# Patient Record
Sex: MALE | Race: WHITE | NOT HISPANIC OR LATINO | Employment: OTHER | URBAN - NONMETROPOLITAN AREA
[De-identification: names, ages, dates, MRNs, and addresses within clinical notes are randomized per-mention and may not be internally consistent; named-entity substitution may affect disease eponyms.]

---

## 2018-07-07 ENCOUNTER — HOSPITAL ENCOUNTER (EMERGENCY)
Facility: HOSPITAL | Age: 53
End: 2018-07-07
Attending: EMERGENCY MEDICINE

## 2025-07-03 ENCOUNTER — HOSPITAL ENCOUNTER (EMERGENCY)
Facility: HOSPITAL | Age: 60
Discharge: HOME | End: 2025-07-03

## 2025-07-03 ENCOUNTER — APPOINTMENT (OUTPATIENT)
Dept: ULTRASOUND IMAGING | Facility: HOSPITAL | Age: 60
End: 2025-07-03
Payer: MEDICAID

## 2025-07-03 VITALS
DIASTOLIC BLOOD PRESSURE: 87 MMHG | OXYGEN SATURATION: 98 % | RESPIRATION RATE: 16 BRPM | WEIGHT: 212 LBS | HEART RATE: 78 BPM | SYSTOLIC BLOOD PRESSURE: 150 MMHG | BODY MASS INDEX: 27.21 KG/M2 | HEIGHT: 74 IN | TEMPERATURE: 98.2 F

## 2025-07-03 DIAGNOSIS — L02.91 ABSCESS: Primary | ICD-10-CM

## 2025-07-03 PROCEDURE — 87070 CULTURE OTHR SPECIMN AEROBIC: CPT | Performed by: EMERGENCY MEDICINE

## 2025-07-03 PROCEDURE — 93976 VASCULAR STUDY: CPT

## 2025-07-03 PROCEDURE — 99284 EMERGENCY DEPT VISIT MOD MDM: CPT

## 2025-07-03 PROCEDURE — 87205 SMEAR GRAM STAIN: CPT | Performed by: EMERGENCY MEDICINE

## 2025-07-03 PROCEDURE — 25010000002 TETANUS-DIPHTH-ACELL PERTUSSIS 5-2.5-18.5 LF-MCG/0.5 SUSPENSION PREFILLED SYRINGE

## 2025-07-03 PROCEDURE — 25010000002 LIDOCAINE PF 1% 1 % SOLUTION

## 2025-07-03 PROCEDURE — 90715 TDAP VACCINE 7 YRS/> IM: CPT

## 2025-07-03 PROCEDURE — 90471 IMMUNIZATION ADMIN: CPT

## 2025-07-03 PROCEDURE — 76870 US EXAM SCROTUM: CPT

## 2025-07-03 PROCEDURE — 87186 SC STD MICRODIL/AGAR DIL: CPT | Performed by: EMERGENCY MEDICINE

## 2025-07-03 PROCEDURE — 87077 CULTURE AEROBIC IDENTIFY: CPT | Performed by: EMERGENCY MEDICINE

## 2025-07-03 RX ORDER — CEPHALEXIN 500 MG/1
500 CAPSULE ORAL 2 TIMES DAILY
Qty: 20 CAPSULE | Refills: 0 | Status: SHIPPED | OUTPATIENT
Start: 2025-07-03 | End: 2025-07-13

## 2025-07-03 RX ORDER — SULFAMETHOXAZOLE AND TRIMETHOPRIM 800; 160 MG/1; MG/1
1 TABLET ORAL ONCE
Status: COMPLETED | OUTPATIENT
Start: 2025-07-03 | End: 2025-07-03

## 2025-07-03 RX ORDER — SULFAMETHOXAZOLE AND TRIMETHOPRIM 800; 160 MG/1; MG/1
1 TABLET ORAL 2 TIMES DAILY
Qty: 20 TABLET | Refills: 0 | Status: SHIPPED | OUTPATIENT
Start: 2025-07-03 | End: 2025-07-13

## 2025-07-03 RX ORDER — HYDROCODONE BITARTRATE AND ACETAMINOPHEN 5; 325 MG/1; MG/1
1 TABLET ORAL ONCE
Refills: 0 | Status: COMPLETED | OUTPATIENT
Start: 2025-07-03 | End: 2025-07-03

## 2025-07-03 RX ORDER — CEPHALEXIN 500 MG/1
500 CAPSULE ORAL ONCE
Status: COMPLETED | OUTPATIENT
Start: 2025-07-03 | End: 2025-07-03

## 2025-07-03 RX ORDER — LIDOCAINE HYDROCHLORIDE 10 MG/ML
10 INJECTION, SOLUTION EPIDURAL; INFILTRATION; INTRACAUDAL; PERINEURAL ONCE
Status: COMPLETED | OUTPATIENT
Start: 2025-07-03 | End: 2025-07-03

## 2025-07-03 RX ORDER — NAPROXEN 500 MG/1
500 TABLET ORAL 2 TIMES DAILY PRN
Qty: 20 TABLET | Refills: 0 | Status: SHIPPED | OUTPATIENT
Start: 2025-07-03 | End: 2025-07-13

## 2025-07-03 RX ADMIN — LIDOCAINE HYDROCHLORIDE 10 ML: 10 INJECTION, SOLUTION EPIDURAL; INFILTRATION; INTRACAUDAL; PERINEURAL at 13:19

## 2025-07-03 RX ADMIN — TETANUS TOXOID, REDUCED DIPHTHERIA TOXOID AND ACELLULAR PERTUSSIS VACCINE, ADSORBED 0.5 ML: 5; 2.5; 8; 8; 2.5 SUSPENSION INTRAMUSCULAR at 14:09

## 2025-07-03 RX ADMIN — HYDROCODONE BITARTRATE AND ACETAMINOPHEN 1 TABLET: 5; 325 TABLET ORAL at 13:17

## 2025-07-03 RX ADMIN — CEPHALEXIN 500 MG: 500 CAPSULE ORAL at 13:17

## 2025-07-03 RX ADMIN — SULFAMETHOXAZOLE AND TRIMETHOPRIM 1 TABLET: 800; 160 TABLET ORAL at 13:17

## 2025-07-03 NOTE — ED PROVIDER NOTES
CHIEF CONCERN   Chief Complaint   Patient presents with    Abscess     Boil under left back/buttock x3 days. Has been using hot baths and compresses with minimal relief.        Subjective     History of Present Illness  This is a patient with a history of a previous abscess presenting with a boil.    The patient reports noticing a boil located between his leg and buttock 2 days ago. He describes the area as painful, with the discomfort extending to his testicles. He reports no systemic symptoms such as fever or chills. He has a history of a similar abscess in the same region, which was treated with antibiotics, but he notes that this current condition is more painful. He does not recall any previous abscesses opening up. He has no known allergies to penicillin and can tolerate Bactrim. He reports no history of MRSA infection or diabetes.          Review of Systems   Constitutional:  Positive for fatigue.   HENT:  Negative for congestion.    Skin:  Positive for color change and wound.       History reviewed. No pertinent past medical history.    History reviewed. No pertinent surgical history.    History reviewed. No pertinent family history.    Social History     Socioeconomic History    Marital status:    Tobacco Use    Smoking status: Former     Current packs/day: 0.00     Types: Cigarettes     Quit date: 2013     Years since quittin.6    Smokeless tobacco: Never   Substance and Sexual Activity    Alcohol use: Yes       No Known Allergies    No current facility-administered medications on file prior to encounter.     Current Outpatient Medications on File Prior to Encounter   Medication Sig Dispense Refill    Chlorcyclizine-Pseudoephed (STAHIST AD) 25-60 MG tablet One tablet three times a day as needed for congestion 30 tablet 0    Dextromethorphan Polistirex (DELSYM PO) Take  by mouth.      guaiFENesin (MUCINEX PO) Take  by mouth.      guaiFENesin-codeine (ROMILAR-AC) 100-10 MG/5ML syrup 5-10  "cc's by mouth QID as needed for cough 236 mL 0       /87 (BP Location: Left arm, Patient Position: Lying)   Pulse 78   Temp 98.2 °F (36.8 °C) (Oral)   Resp 16   Ht 188 cm (74\")   Wt 96.2 kg (212 lb)   SpO2 98%   BMI 27.22 kg/m²     Objective     Physical Exam  Vitals and nursing note reviewed. Exam conducted with a chaperone present.   Constitutional:       General: He is not in acute distress.     Appearance: He is not ill-appearing, toxic-appearing or diaphoretic.   HENT:      Head: Normocephalic and atraumatic.   Cardiovascular:      Rate and Rhythm: Normal rate.   Pulmonary:      Effort: Pulmonary effort is normal. No respiratory distress.      Breath sounds: Normal breath sounds.   Abdominal:      General: Bowel sounds are normal.      Palpations: Abdomen is soft.   Genitourinary:     Rectum: Mass and tenderness present. No internal hemorrhoid. Normal anal tone.       Musculoskeletal:         General: Swelling and tenderness present.      Cervical back: Neck supple.   Skin:     General: Skin is warm.      Capillary Refill: Capillary refill takes less than 2 seconds.      Findings: Erythema and lesion present.   Neurological:      Mental Status: He is alert.      Sensory: No sensory deficit.   Psychiatric:         Mood and Affect: Mood normal.         Behavior: Behavior normal.         Physical Exam  Chaperone: Present  Integument/Skin: Boil present on the skin between the leg and buttock, tender to palpation    Incision & Drainage    Date/Time: 7/3/2025 3:00 PM    Performed by: Nando Shen APRN  Authorized by: Nando Shen APRN    Consent:     Consent obtained:  Verbal    Consent given by:  Patient    Risks discussed:  Bleeding and incomplete drainage    Alternatives discussed:  Referral  Sherman protocol:     Procedure explained and questions answered to patient or proxy's satisfaction: yes      Patient identity confirmed:  Verbally with patient  Location:     Type:  Abscess    " Location:  Anogenital    Anogenital location:  Scrotal space  Pre-procedure details:     Skin preparation:  Povidone-iodine  Sedation:     Sedation type:  None  Anesthesia:     Anesthesia method:  Local infiltration    Local anesthetic:  Lidocaine 1% WITH epi  Procedure type:     Complexity:  Simple  Procedure details:     Ultrasound guidance: no      Needle aspiration: no      Incision types:  Stab incision    Incision depth:  Subcutaneous    Wound management:  Probed and deloculated, irrigated with saline and extensive cleaning    Drainage:  Purulent    Drainage amount:  Moderate    Packing materials:  1/2 in iodoform gauze  Post-procedure details:     Procedure completion:  Tolerated well, no immediate complications  Comments:      Patient tolerated the procedure well.  Culture obtained.   patient expressed immediate relief after lancing the abscess.  Encouraged to remove the packing in 48 hours.  Discussed wound care, will start on dual antibiotics and cover empirically MRSA and staph considering the area and abscess drained.           ED Course  ED Course as of 07/04/25 1728   Thu Jul 03, 2025   1339 Ultrasound at bedside [SN]   1459  patient's ultrasound shows no evidence of testicular torsion or mass lesions, patient does have small bilateral hydrocele ease.  Will I&D the abscess on the left Glutarol patient.  Plan to discharge home with dual antibiotics to cover staph and my MRSA.  Close follow-up with PCP for reevaluation. [SN]   1524 Discussed wound care with patient.  Tolerated procedure.  Wound culture obtained and sent.  Patient is aware to remove the packing in 48 hours.  Warm compress.  Worsening of her wound discussed with verbal understanding.  Encouraged to follow-up with primary care doctor for reevaluation of the wound. [SN]      ED Course User Index  [SN] Nando Shen APRN       Lab Results (last 24 hours)       ** No results found for the last 24 hours. **         US Scrotum & Testicles  with doppler  Result Date: 7/3/2025  Impression: Electronically Signed: Bandar Hernadez MD  7/3/2025 2:26 PM EDT  Workstation ID: [id number]    Narrative & Impression   US SCROTUM AND TESTICLES WITH DOPPLER     Date of Exam: 7/3/2025 1:18 PM EDT     Indication: abscess / left testicular pain.     Comparison: None available.     Technique: Multiple sonographic images of the testicles were obtained in transverse and longitudinal planes using grayscale imaging. Color and spectral Doppler imaging was also obtained.     Findings:  Right testicle measures 3.3 x 2.0 x 3.8 cm. Left testicle measures 3.0 x 2.1 x 3.8 cm.     The testicular parenchyma is homogeneous bilaterally. No evidence of intratesticular mass lesion. There is normal color Doppler flow to both testicles.     There is a small right hydrocele. Within the right epididymis there is a cyst or spermatocele measuring up to 1.2 cm in size.     There is a small left-sided hydrocele. Within the left epididymis there is a 8 mm cyst or spermatocele.     Patient has a palpable lump along the left inferior gluteal region. This is reported to be palpable. Images of this region demonstrate a complex hypoechoic masslike area measuring 3.6 x 3.0 cm in size. This has some increased through transmission and   peripheral color Doppler flow. This may represent a small abscess within the superficial soft tissues.       IMPRESSION:  Impression:    1. No evidence of testicular torsion or intratesticular mass lesion.  2. Small bilateral hydroceles.  3. At the site of the patient's palpable abnormality within the left gluteal region there is a 3.6 cm hypoechoic masslike area which could represent an abscess. Clinical correlation recommended.               Medical Decision Making  Patient is abscess and scrotal pain that he noticed 2 days ago.  Patient does have a history of abscess previously.  Cannot recall the last time he had a tetanus.  Boil between leg and buttock and tenderness  extends  to scrotum. Ultrasound performed to ensure abscess does not extend into scrotum/ rule out testicular torsion considering the radiating pain.   Pain medication, will start a dose of antibiotic in the ED pending results patient is agreeable to plan  Plan to I&D and DC home if no complication and abnormal findings that require admission  Tolerated the procedure well.  See procedure notes below.    Problems Addressed:  Abscess: complicated acute illness or injury    Risk  Prescription drug management.        Diagnoses and all orders for this visit:    1. Abscess (Primary)    Other orders  -     Cancel: US Testicular or Ovarian Vascular Complete; Standing  -     HYDROcodone-acetaminophen (NORCO) 5-325 MG per tablet 1 tablet; Refill: 0  -     lidocaine PF 1% (XYLOCAINE) injection 10 mL  -     Lac chart  please for I/D  [facility] Nursing Order (Specify); Standing  -     cephalexin (KEFLEX) capsule 500 mg  -     sulfamethoxazole-trimethoprim (BACTRIM DS,SEPTRA DS) 800-160 MG per tablet 1 tablet  -     Cancel: US Testicular or Ovarian Vascular Complete  -     Lac chart  please for I/D  [facility] Nursing Order (Specify)  -     US Scrotum & Testicles with doppler; Standing  -     US Scrotum & Testicles with doppler  -     Tetanus-Diphth-Acell Pertussis (BOOSTRIX) injection 0.5 mL  -     Incision & Drainage; Standing  -     Incision & Drainage  -     Wound Culture - Wound, Buttock, Left; Standing  -     Wound Culture - Wound, Buttock, Left  -     cephalexin (KEFLEX) 500 MG capsule; Take 1 capsule by mouth 2 (Two) Times a Day for 10 days.  Dispense: 20 capsule; Refill: 0  -     sulfamethoxazole-trimethoprim (BACTRIM DS,SEPTRA DS) 800-160 MG per tablet; Take 1 tablet by mouth 2 (Two) Times a Day for 10 days.  Dispense: 20 tablet; Refill: 0  -     naproxen (EC NAPROSYN) 500 MG EC tablet; Take 1 tablet by mouth 2 (Two) Times a Day As Needed for Mild Pain for up to 10 days.  Dispense: 20 tablet; Refill: 0        Final diagnoses:    Abscess        Follow-up Information       Jelani Rivas MD. Schedule an appointment as soon as possible for a visit in 3 days.    Specialty: Internal Medicine  Contact information:  [address] [facility] [id number] [phone]                             New Medications Ordered This Visit   Medications    HYDROcodone-acetaminophen (NORCO) 5-325 MG per tablet 1 tablet     Refill:  0    lidocaine PF 1% (XYLOCAINE) injection 10 mL    cephalexin (KEFLEX) capsule 500 mg    sulfamethoxazole-trimethoprim (BACTRIM DS,SEPTRA DS) 800-160 MG per tablet 1 tablet    Tetanus-Diphth-Acell Pertussis (BOOSTRIX) injection 0.5 mL    cephalexin (KEFLEX) 500 MG capsule     Sig: Take 1 capsule by mouth 2 (Two) Times a Day for 10 days.     Dispense:  20 capsule     Refill:  0    sulfamethoxazole-trimethoprim (BACTRIM DS,SEPTRA DS) 800-160 MG per tablet     Sig: Take 1 tablet by mouth 2 (Two) Times a Day for 10 days.     Dispense:  20 tablet     Refill:  0    naproxen (EC NAPROSYN) 500 MG EC tablet     Sig: Take 1 tablet by mouth 2 (Two) Times a Day As Needed for Mild Pain for up to 10 days.     Dispense:  20 tablet     Refill:  0         Patient or patient representative verbalized consent for the use of Ambient Listening during the visit for chart documentation.  MARSHALL Bond 7/4/2025 17:28 EDT    FOR FULL DISCHARGE INSTRUCTIONS/COMMENTS/HANDOUTS please see the AVS

## 2025-07-03 NOTE — DISCHARGE INSTRUCTIONS
Tdap is updated. Do not get wound  wet for the next 48 hours you may remove the packing.clean thewound with Dial soap and warm water. Pat dry Clean once a day. DO NOT SOAK the wound ever. Avoid pools, hot tubs, ponds, streams. Keep clean and dry. It should take 10 days to close.  You may return here or follow-up with your PCP for wound recheck.  keep wound covered during the day while active and at bedtime, otherwise you may leave it open to breathe. Monitor for any worsening signs of infection such as swelling, redness, or pus-like drainage. Should these present, please follow-up with your PCP or return here immediately.

## 2025-07-05 LAB
BACTERIA SPEC AEROBE CULT: ABNORMAL
GRAM STN SPEC: ABNORMAL
GRAM STN SPEC: ABNORMAL

## 2025-07-07 ENCOUNTER — RESULTS FOLLOW-UP (OUTPATIENT)
Dept: EMERGENCY DEPT | Facility: HOSPITAL | Age: 60
End: 2025-07-07

## 2025-07-07 NOTE — TELEPHONE ENCOUNTER
Attempt number one to call patient regarding culture results and adjustment to antimicrobial regimen. Asked patient to call back.

## 2025-07-07 NOTE — PROGRESS NOTES
Patient wound culture resulted with E. coli. Susceptible to Cephalosporins (2nd gen). Patient was given Rx for cephalexin and sulfamethoxazole/ trimethoprim. Therapy is insufficient coverage. Discussed with Dr. Hsieh. New prescription for cefuroxime 500 mg PO TID. Unable to reach patient to discuss. Will call to the patient's preferred pharmacy when identified..    Freya Echols, PharmD  7/7/2025 08:44 EDT    Microbiology Results (last 10 days)       Procedure Component Value - Date/Time    Wound Culture - Wound, Buttock, Left [[id number]]  (Abnormal)  (Susceptibility) Collected: 07/03/25 1520    Lab Status: Final result Specimen: Wound from Buttock, Left Updated: 07/05/25 0700     Wound Culture Moderate growth (3+) Escherichia coli     Gram Stain Many (4+) WBCs seen      Occasional Gram negative bacilli    Susceptibility        Escherichia coli      LUIS M      Amoxicillin + Clavulanate 16 ug/ml Intermediate      Ampicillin >=32 ug/ml Resistant      Ampicillin + Sulbactam >=32 ug/ml Resistant      Cefazolin (Non Urine) 8 ug/ml Resistant      Cefepime <=0.12 ug/ml Susceptible      Ceftazidime <=0.5 ug/ml Susceptible      Ceftriaxone <=0.25 ug/ml Susceptible      Cefuroxime axetil 4 ug/ml Susceptible      Gentamicin <=1 ug/ml Susceptible      Levofloxacin <=0.12 ug/ml Susceptible      Piperacillin + Tazobactam <=4 ug/ml Susceptible      Tetracycline >=16 ug/ml Resistant      Trimethoprim + Sulfamethoxazole >=320 ug/ml Resistant                       Susceptibility Comments       Escherichia coli    With the exception of urinary-sourced infections, aminoglycosides should not be used as monotherapy.

## 2025-07-08 NOTE — TELEPHONE ENCOUNTER
Attempt number two to call patient regarding culture results and adjustment to antimicrobial regimen. Asked patient to call back.

## 2025-07-09 RX ORDER — CEFUROXIME AXETIL 500 MG/1
500 TABLET ORAL 3 TIMES DAILY
Qty: 21 TABLET | Refills: 0 | Status: SHIPPED | OUTPATIENT
Start: 2025-07-09

## 2025-07-09 NOTE — PROGRESS NOTES
Patient wound culture resulted with E. coli. Susceptible to Cephalosporins (2nd gen). Patient was given Rx for cefuroxime. Therapy is insufficient coverage. Discussed with Dr. Hsieh. New prescription for cefuroxime e-prescribed to the patient's preferred pharmacy: [facility] in [place]. Spoke with patient who agreed with treatment plan.    Freya Echols, PharmD  7/9/2025 09:09 EDT